# Patient Record
Sex: FEMALE | URBAN - METROPOLITAN AREA
[De-identification: names, ages, dates, MRNs, and addresses within clinical notes are randomized per-mention and may not be internally consistent; named-entity substitution may affect disease eponyms.]

---

## 2018-06-25 ENCOUNTER — NURSE TRIAGE (OUTPATIENT)
Dept: NURSING | Facility: CLINIC | Age: 18
End: 2018-06-25

## 2018-06-25 NOTE — TELEPHONE ENCOUNTER
Father  Is out of [place] and initiated call; patient is on  campus  And  Is  anxious and depressed;   Attempted to conference call and then transfer call . Patient came on line and hung up after FNA introduction.   Called father  Back   Advised to  Have patient call FNA  Or have her go to ED At [facility] if he felt she needed to  see someone tonight   He understands and will call her back   Nadia Rankin RN  FNA      Additional Information    [1] Caller is not with the adult (patient) AND [2] reporting urgent symptoms    Protocols used: INFORMATION ONLY CALL-ADULT-